# Patient Record
Sex: FEMALE | Race: WHITE
[De-identification: names, ages, dates, MRNs, and addresses within clinical notes are randomized per-mention and may not be internally consistent; named-entity substitution may affect disease eponyms.]

---

## 2018-10-08 ENCOUNTER — HOSPITAL ENCOUNTER (EMERGENCY)
Dept: HOSPITAL 72 - EMR | Age: 33
Discharge: HOME | End: 2018-10-08
Payer: COMMERCIAL

## 2018-10-08 VITALS — DIASTOLIC BLOOD PRESSURE: 83 MMHG | SYSTOLIC BLOOD PRESSURE: 123 MMHG

## 2018-10-08 VITALS — HEIGHT: 66 IN | WEIGHT: 132 LBS | BODY MASS INDEX: 21.21 KG/M2

## 2018-10-08 VITALS — SYSTOLIC BLOOD PRESSURE: 123 MMHG | DIASTOLIC BLOOD PRESSURE: 83 MMHG
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
## 2018-10-08 DIAGNOSIS — Z88.2: ICD-10-CM
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
## 2018-10-08 DIAGNOSIS — S61.216A: Primary | ICD-10-CM

## 2018-10-08 DIAGNOSIS — Z88.0: ICD-10-CM
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
## 2018-10-08 PROCEDURE — 99283 EMERGENCY DEPT VISIT LOW MDM: CPT

## 2018-10-08 NOTE — EMERGENCY ROOM REPORT
History of Present Illness


General


Chief Complaint:  Laceration


Source:  Patient





Present Illness


HPI


33-year-old female patient presents ER complaining of cut on her right pinky 

finger 3 days ago.  Reports that she was at work when a piece of glass cut her 

left pinky finger.  Reports cleaning of the time, states that she put a "white 

powder" from the first aid kit on at that site of the bleeding.  Reports has 

been keeping it clean and dry dressing the wound since that time.  Reports has 

been applying Neosporin to affected area.  Reports numbness in left side of 

palm since that time.  Denies loss of range of motion.  Denies pain with 

movement.  Denies other acute symptoms.  Denies ecchymosis or erythema. reports 

she is right-hand dominant.  States has not been taking Tylenol or Motrin. 

reports had tetanus shot within the last 10 years.


Allergies:  


Coded Allergies:  


     PENICILLIN V (Verified  Allergy, Unknown, 10/8/18)


     SULFA (SULFONAMIDE ANTIBIOTICS) (Verified  Allergy, Unknown, 10/8/18)





Patient History


Past Medical History:  see triage record


Last Menstrual Period:  10/02/18


Reviewed Nursing Documentation:  PMH: Agreed; PSxH: Agreed





Nursing Documentation-PMH


Past Medical History:  No Stated History





Review of Systems


All Other Systems:  negative except mentioned in HPI





Physical Exam





Vital Signs








  Date Time  Temp Pulse Resp B/P (MAP) Pulse Ox O2 Delivery O2 Flow Rate FiO2


 


10/8/18 15:00 98.8 67 16 123/83 97 Room Air  





 98.8       








Sp02 EP Interpretation:  reviewed, normal


General Appearance:  well appearing, no apparent distress, alert, GCS 15, non-

toxic


Head:  normocephalic, atraumatic


Eyes:  bilateral eye normal inspection, bilateral eye PERRL


ENT:  hearing grossly normal, normal pharynx, no angioedema, normal voice, 

uvula midline, moist mucus membranes


Neck:  full range of motion


Respiratory:  lungs clear, normal breath sounds, no rhonchi, no respiratory 

distress, no accessory muscle use, no wheezing, speaking full sentences


Cardiovascular #1:  regular rate, rhythm, no edema


Cardiovascular #2:  2+ radial (R), 2+ radial (L)


Musculoskeletal:  back normal, digits/nails normal, gait/station normal, normal 

range of motion - with flexion and extension, non-tender, other - no fusiform 

swelling, no tenderness to palpation over her flexor tendon


Neurologic:  alert, oriented x3, responsive, motor strength/tone normal, 

sensory intact


Psychiatric:  mood/affect normal


Skin:  abrasions - 2 cm abrasion on the radial aspect of right pinky finger at 

the distal phalanx extending proximally towards the middle phalanx, no 

surrounding erythema or edema, no





Medical Decision Making


Diagnostic Impression:  


 Primary Impression:  


 Healing laceration


ER Course


Pt presents to ED c/o cut on small pinky finger.





DDX considered but are not limited to laceration, abrasion, contusion, 

cellulitis.





VITAL SIGNS are WNL, patient is afebrile





ED INTERVENTIONS:  


Wound was cleaned and irrigated using  normal saline. 


wound does not require sutures or skin glue at this time, no repairable 

laceration noted,  exam consistent abrasion versus healing laceration wound.


sensation intact to light touch, likely localized inflammation causing numbness 

sensation.  Patient take Tylenol for relief of symptoms.


full range of motion, no tenderness to palpation, does not require x-ray 

imaging at this time, low suspicion for fracture.





Wound cleaned and covered using sterile dressing and Bacitracin.


Patient reports understanding and agreement to treatment plan.


Keep wound clean and dry.


Followup with PCP in 5-7 days for wound check.


ER precautions given.


Workmen's Compensation paperwork completed.





DISCHARGE: 


Rx provided for Bacitracin


Rx provided for Tylenol





At this time pt is stable for d/c to home. Patient resting comfortably, in no 

acute distress, nontoxic appearing, talking without difficulty.


Will provide with patient care instructions and any necessary prescriptions.


Patient to take medication as instructed.


Care plan and follow-up instructions provided.


Work note provided to patient.


Patient questions asked and answered.


Patient instructed to follow-up with primary care provider for wound check and 

suture removal.


ER precautions given. Patient instructed to return to ER immediately for any 

new or worsening of symptoms.





- Please note that this Emergency Department Report was dictated using [employer] technology software, occasionally this can lead to 

erroneous entry secondary to interpretation by the dictation equipment.





Last Vital Signs








  Date Time  Temp Pulse Resp B/P (MAP) Pulse Ox O2 Delivery O2 Flow Rate FiO2


 


10/8/18 15:00 98.8 67 16 123/83 97 Room Air  





 98.8       








Disposition:  HOME, SELF-CARE


Condition:  Stable


Scripts


Acetaminophen* (TYLENOL EXTRA STRENGTH*) 500 Mg Tablet


500 MG ORAL Q8H PRN for Prn Headache/Temp > 101, #30 TAB 0 Refills


   Prov: Socrates Mcghee         10/8/18 


Bacitracin/Polymyxin B Sulfate (BACITRACIN-POLYMYXIN OINTMENT) 28.35 Gm 

Oint...g.


1 APPLIC TP BID, #28 GM


   Prov: Socrates Mcghee         10/8/18


Patient Instructions:  Nonsutured Laceration Care





Additional Instructions:  


Followup with primary care provider in 3 -5 days.


Take medications as directed. 


Keep wound clean and dry.  Apply Neosporin to help reduce appearances car.


Patient questions asked and answered.


ER precautions given, patient instructed to return to ER immediately for any 

new or worsening of symptoms.











Socrates Mcghee Oct 8, 2018 15:12